# Patient Record
Sex: FEMALE | Race: OTHER | HISPANIC OR LATINO | URBAN - METROPOLITAN AREA
[De-identification: names, ages, dates, MRNs, and addresses within clinical notes are randomized per-mention and may not be internally consistent; named-entity substitution may affect disease eponyms.]

---

## 2018-09-14 VITALS
WEIGHT: 119.93 LBS | RESPIRATION RATE: 19 BRPM | HEIGHT: 62 IN | DIASTOLIC BLOOD PRESSURE: 69 MMHG | OXYGEN SATURATION: 100 % | SYSTOLIC BLOOD PRESSURE: 136 MMHG | HEART RATE: 60 BPM

## 2018-09-14 RX ORDER — CHLORHEXIDINE GLUCONATE 213 G/1000ML
1 SOLUTION TOPICAL ONCE
Qty: 0 | Refills: 0 | Status: DISCONTINUED | OUTPATIENT
Start: 2018-09-17 | End: 2018-09-17

## 2018-09-14 NOTE — H&P ADULT - HISTORY OF PRESENT ILLNESS
Patient is a 51 y/o female with PMHx of Confirm meds upon arrival     Patient is a 51 y/o current smoker female with PMHx of HLD, DM who presented to cardiologist Dr. Echols c/o [place] on an incline that is subsequently resolved with rest. In addition pt endorses occasional palpitations that occur during rest or exertion for one year that resolve on their own.     Pt denies CP, syncope, PND/orthopnea or LE edema    ECHO 03/30/2018: EF >55%, impaired LV relaxation with elevated LA pressure. CTA per MD note Agatston score 85, dLAD moderate stenosis (pending official report)    In light of pt's risk factors, CCS III anginal equivalent symptoms, and abnormal CTA pt is referred for cardiac catheterization with possible intervention if clinically indicated to r/o CAD Confirm meds upon arrival     Patient is a 53 y/o current smoker female with PMHx of HLD, DM who presented to cardiologist Dr. Echols c/o [place] on an incline that is subsequently resolved with rest. In addition pt endorses occasional palpitations that occur during rest or exertion for one year that resolve on their own.     Pt denies CP, syncope, PND/orthopnea or LE edema    ECHO 03/30/2018: EF >55%, impaired LV relaxation with elevated LA pressure. CTA 12/2016: Agatston score 85, dLAD moderate stenosis, proximal and mid LAD mild stenosis due to calcific plaques. NST 11/2016: myocardial perfusion imaging is normal, 1mm horizontal ST segment during stress which resolved promptly in recovery.     In light of pt's risk factors, CCS III anginal equivalent symptoms, and abnormal CTA pt is referred for cardiac catheterization with possible intervention if clinically indicated to r/o CAD Confirm meds upon arrival   SKELETON    Patient is a 51 y/o current smoker female with PMHx of HLD, DM who presented to cardiologist Dr. Echols c/o [place] on an incline that is subsequently resolved with rest. In addition pt endorses occasional palpitations that occur during rest or exertion for one year that resolve on their own.     Pt denies CP, syncope, PND/orthopnea or LE edema    ECHO 03/30/2018: EF >55%, impaired LV relaxation with elevated LA pressure. CTA 12/2016: Agatston score 85, dLAD moderate stenosis, proximal and mid LAD mild stenosis due to calcific plaques. NST 11/2016: myocardial perfusion imaging is normal, 1mm horizontal ST segment during stress which resolved promptly in recovery.     In light of pt's risk factors, CCS III anginal equivalent symptoms, and abnormal CTA pt is referred for cardiac catheterization with possible intervention if clinically indicated to r/o CAD 52 y.o Female Current smoker with PMHx of Hyperlipidemia, NIDDM, who presented to cardiologist Dr. Echols c/o J[place] on an incline that is subsequently resolved with rest. In addition pt endorses occasional palpitations that occur during rest or exertion for one year that resolve on their own. Pt denies CP, syncope, PND/orthopnea or LE edema.  No recent diagnostic cardiac studies performed.  ECHO 03/30/2018: EF >55%, impaired LV relaxation with elevated LA pressure. CTA 12/2016: Agatston score 85, dLAD moderate stenosis, proximal and mid LAD mild stenosis due to calcific plaques. NST 11/2016: myocardial perfusion imaging is normal, 1mm horizontal ST segment during stress which resolved promptly in recovery.     In light of pt's risk factors, CCS III anginal equivalent symptoms, and abnormal CTA pt is referred for cardiac catheterization with possible intervention if clinically indicated to r/o CAD.

## 2018-09-14 NOTE — H&P ADULT - ASSESSMENT
52 y.o Female Current smoker with PMHx of Hyperlipidemia, NIDDM, who presents for recommended LHC with possible intervention if clinically indicated secondary to CCS Anginal Class 3 Equivalent symptoms in the setting of an abnormal NST and Coronary CTA.        ASA: III and Mallampati: III    -Precath/Consented  - IVF Hydration NS @ 75cc/hr.  -Loaded with ASA 325mg PO X 1 and Plavix 600mg PO X 1

## 2018-09-17 ENCOUNTER — OUTPATIENT (OUTPATIENT)
Dept: OUTPATIENT SERVICES | Facility: HOSPITAL | Age: 53
LOS: 1 days | Discharge: NURSING, REHAB OR LONG-TERM CARE | End: 2018-09-17
Payer: COMMERCIAL

## 2018-09-17 DIAGNOSIS — Z98.890 OTHER SPECIFIED POSTPROCEDURAL STATES: Chronic | ICD-10-CM

## 2018-09-17 LAB
ANION GAP SERPL CALC-SCNC: 15 MMOL/L — SIGNIFICANT CHANGE UP (ref 5–17)
APTT BLD: 27.5 SEC — SIGNIFICANT CHANGE UP (ref 27.5–37.4)
BASOPHILS NFR BLD AUTO: 0.5 % — SIGNIFICANT CHANGE UP (ref 0–2)
BUN SERPL-MCNC: 9 MG/DL — SIGNIFICANT CHANGE UP (ref 7–23)
CALCIUM SERPL-MCNC: 10.2 MG/DL — SIGNIFICANT CHANGE UP (ref 8.4–10.5)
CHLORIDE SERPL-SCNC: 98 MMOL/L — SIGNIFICANT CHANGE UP (ref 96–108)
CHOLEST SERPL-MCNC: 318 MG/DL — HIGH (ref 10–199)
CO2 SERPL-SCNC: 26 MMOL/L — SIGNIFICANT CHANGE UP (ref 22–31)
CREAT SERPL-MCNC: 0.63 MG/DL — SIGNIFICANT CHANGE UP (ref 0.5–1.3)
CRP SERPL-MCNC: 0.04 MG/DL — SIGNIFICANT CHANGE UP (ref 0–0.4)
EOSINOPHIL NFR BLD AUTO: 2.8 % — SIGNIFICANT CHANGE UP (ref 0–6)
GLUCOSE SERPL-MCNC: 142 MG/DL — HIGH (ref 70–99)
HBA1C BLD-MCNC: 7.9 % — HIGH (ref 4–5.6)
HCT VFR BLD CALC: 44 % — SIGNIFICANT CHANGE UP (ref 34.5–45)
HDLC SERPL-MCNC: 76 MG/DL — SIGNIFICANT CHANGE UP
HGB BLD-MCNC: 14.4 G/DL — SIGNIFICANT CHANGE UP (ref 11.5–15.5)
INR BLD: 0.96 — SIGNIFICANT CHANGE UP (ref 0.88–1.16)
LIPID PNL WITH DIRECT LDL SERPL: 206 MG/DL — HIGH
LYMPHOCYTES # BLD AUTO: 39.8 % — SIGNIFICANT CHANGE UP (ref 13–44)
MCHC RBC-ENTMCNC: 27.6 PG — SIGNIFICANT CHANGE UP (ref 27–34)
MCHC RBC-ENTMCNC: 32.7 G/DL — SIGNIFICANT CHANGE UP (ref 32–36)
MCV RBC AUTO: 84.5 FL — SIGNIFICANT CHANGE UP (ref 80–100)
MONOCYTES NFR BLD AUTO: 10.4 % — SIGNIFICANT CHANGE UP (ref 2–14)
NEUTROPHILS NFR BLD AUTO: 46.5 % — SIGNIFICANT CHANGE UP (ref 43–77)
PLATELET # BLD AUTO: 321 K/UL — SIGNIFICANT CHANGE UP (ref 150–400)
POTASSIUM SERPL-MCNC: 4 MMOL/L — SIGNIFICANT CHANGE UP (ref 3.5–5.3)
POTASSIUM SERPL-SCNC: 4 MMOL/L — SIGNIFICANT CHANGE UP (ref 3.5–5.3)
PROTHROM AB SERPL-ACNC: 10.6 SEC — SIGNIFICANT CHANGE UP (ref 9.8–12.7)
RBC # BLD: 5.21 M/UL — HIGH (ref 3.8–5.2)
RBC # FLD: 13.7 % — SIGNIFICANT CHANGE UP (ref 10.3–16.9)
SODIUM SERPL-SCNC: 139 MMOL/L — SIGNIFICANT CHANGE UP (ref 135–145)
TOTAL CHOLESTEROL/HDL RATIO MEASUREMENT: 4.2 RATIO — SIGNIFICANT CHANGE UP (ref 3.3–7.1)
TRIGL SERPL-MCNC: 178 MG/DL — HIGH (ref 10–149)
WBC # BLD: 10.2 K/UL — SIGNIFICANT CHANGE UP (ref 3.8–10.5)
WBC # FLD AUTO: 10.2 K/UL — SIGNIFICANT CHANGE UP (ref 3.8–10.5)

## 2018-09-17 PROCEDURE — 93005 ELECTROCARDIOGRAM TRACING: CPT

## 2018-09-17 PROCEDURE — 93571 IV DOP VEL&/PRESS C FLO 1ST: CPT | Mod: 26

## 2018-09-17 PROCEDURE — 82962 GLUCOSE BLOOD TEST: CPT

## 2018-09-17 PROCEDURE — C1894: CPT

## 2018-09-17 PROCEDURE — 93458 L HRT ARTERY/VENTRICLE ANGIO: CPT

## 2018-09-17 PROCEDURE — 85025 COMPLETE CBC W/AUTO DIFF WBC: CPT

## 2018-09-17 PROCEDURE — 82553 CREATINE MB FRACTION: CPT

## 2018-09-17 PROCEDURE — 85730 THROMBOPLASTIN TIME PARTIAL: CPT

## 2018-09-17 PROCEDURE — 93010 ELECTROCARDIOGRAM REPORT: CPT

## 2018-09-17 PROCEDURE — C1887: CPT

## 2018-09-17 PROCEDURE — 80061 LIPID PANEL: CPT

## 2018-09-17 PROCEDURE — 93458 L HRT ARTERY/VENTRICLE ANGIO: CPT | Mod: 26

## 2018-09-17 PROCEDURE — 93571 IV DOP VEL&/PRESS C FLO 1ST: CPT | Mod: LD

## 2018-09-17 PROCEDURE — 86140 C-REACTIVE PROTEIN: CPT

## 2018-09-17 PROCEDURE — 82550 ASSAY OF CK (CPK): CPT

## 2018-09-17 PROCEDURE — C1889: CPT

## 2018-09-17 PROCEDURE — 80048 BASIC METABOLIC PNL TOTAL CA: CPT

## 2018-09-17 PROCEDURE — C1760: CPT

## 2018-09-17 PROCEDURE — 85610 PROTHROMBIN TIME: CPT

## 2018-09-17 PROCEDURE — C1769: CPT

## 2018-09-17 PROCEDURE — 83036 HEMOGLOBIN GLYCOSYLATED A1C: CPT

## 2018-09-17 RX ORDER — CLOPIDOGREL BISULFATE 75 MG/1
600 TABLET, FILM COATED ORAL ONCE
Qty: 0 | Refills: 0 | Status: COMPLETED | OUTPATIENT
Start: 2018-09-17 | End: 2018-09-17

## 2018-09-17 RX ORDER — ASPIRIN/CALCIUM CARB/MAGNESIUM 324 MG
1 TABLET ORAL
Qty: 0 | Refills: 0 | COMMUNITY

## 2018-09-17 RX ORDER — ATORVASTATIN CALCIUM 80 MG/1
1 TABLET, FILM COATED ORAL
Qty: 0 | Refills: 0 | COMMUNITY

## 2018-09-17 RX ORDER — METFORMIN HYDROCHLORIDE 850 MG/1
1 TABLET ORAL
Qty: 0 | Refills: 0 | COMMUNITY

## 2018-09-17 RX ORDER — ASPIRIN/CALCIUM CARB/MAGNESIUM 324 MG
325 TABLET ORAL ONCE
Qty: 0 | Refills: 0 | Status: COMPLETED | OUTPATIENT
Start: 2018-09-17 | End: 2018-09-17

## 2018-09-17 RX ORDER — SODIUM CHLORIDE 9 MG/ML
500 INJECTION INTRAMUSCULAR; INTRAVENOUS; SUBCUTANEOUS
Qty: 0 | Refills: 0 | Status: DISCONTINUED | OUTPATIENT
Start: 2018-09-17 | End: 2018-09-17

## 2018-09-17 RX ORDER — SITAGLIPTIN 50 MG/1
1 TABLET, FILM COATED ORAL
Qty: 0 | Refills: 0 | COMMUNITY

## 2018-09-17 RX ORDER — ROSUVASTATIN CALCIUM 5 MG/1
1 TABLET ORAL
Qty: 0 | Refills: 0 | COMMUNITY

## 2018-09-17 RX ORDER — ACETAMINOPHEN 500 MG
650 TABLET ORAL ONCE
Qty: 0 | Refills: 0 | Status: COMPLETED | OUTPATIENT
Start: 2018-09-17 | End: 2018-09-17

## 2018-09-17 RX ORDER — INSULIN LISPRO 100/ML
VIAL (ML) SUBCUTANEOUS ONCE
Qty: 0 | Refills: 0 | Status: COMPLETED | OUTPATIENT
Start: 2018-09-17 | End: 2018-09-17

## 2018-09-17 RX ADMIN — Medication 650 MILLIGRAM(S): at 21:42

## 2018-09-17 RX ADMIN — Medication 325 MILLIGRAM(S): at 16:55

## 2018-09-17 RX ADMIN — CLOPIDOGREL BISULFATE 600 MILLIGRAM(S): 75 TABLET, FILM COATED ORAL at 16:56

## 2018-09-17 RX ADMIN — SODIUM CHLORIDE 75 MILLILITER(S): 9 INJECTION INTRAMUSCULAR; INTRAVENOUS; SUBCUTANEOUS at 17:10

## 2018-09-17 NOTE — PROGRESS NOTE ADULT - SUBJECTIVE AND OBJECTIVE BOX
Interventional Cardiology PA SDA Discharge Note    Patient without complaints. Ambulated and voided without difficulties    Afebrile, VSS    Ext:    		Right Groin:    no  hematoma,   no  bruit, dressing; C/D/I  		        Right   Radial :    no hematoma, no    bleeding, dressing; C/D/I      Pulses:    intact RAD/DP/PT to baseline     A/P:   52 y.o Female Current smoker with PMHx of Hyperlipidemia, NIDDM, who presented to cardiologist Dr. Echols c/o J[place]ON on an incline that is subsequently resolved with rest. In addition pt endorses occasional palpitations that occur during rest or exertion for one year that resolve on their own. Pt denies CP, syncope, PND/orthopnea or LE edema.  No recent diagnostic cardiac studies performed.  ECHO 03/30/2018: EF >55%, impaired LV relaxation with elevated LA pressure. CTA 12/2016: Agatston score 85, dLAD moderate stenosis, proximal and mid LAD mild stenosis due to calcific plaques. NST 11/2016: myocardial perfusion imaging is normal, 1mm horizontal ST segment during stress which resolved promptly in recovery. In light of pt's risk factors, CCS III anginal equivalent symptoms, and abnormal CTA pt is referred for cardiac catheterization with possible intervention if clinically indicated to r/o CAD. Pt cath revealed 09/17 mLAD FFR 0.83.                   1.	Stable for discharge as per attending Dr. Catsro after bed rest, pt voids, groin/wrist stable and 30 minutes of ambulation.  2.	Follow-up with Cardiologist Dr. Echols in 1-2 weeks  3.	Discharged forms signed and copies in chart